# Patient Record
Sex: MALE | HISPANIC OR LATINO | URBAN - METROPOLITAN AREA
[De-identification: names, ages, dates, MRNs, and addresses within clinical notes are randomized per-mention and may not be internally consistent; named-entity substitution may affect disease eponyms.]

---

## 2021-11-20 ENCOUNTER — EMERGENCY (EMERGENCY)
Facility: HOSPITAL | Age: 41
LOS: 1 days | Discharge: HOME | End: 2021-11-20
Admitting: EMERGENCY MEDICINE
Payer: COMMERCIAL

## 2021-11-20 VITALS
RESPIRATION RATE: 18 BRPM | HEART RATE: 74 BPM | HEIGHT: 67 IN | WEIGHT: 145.06 LBS | TEMPERATURE: 99 F | DIASTOLIC BLOOD PRESSURE: 80 MMHG | OXYGEN SATURATION: 100 % | SYSTOLIC BLOOD PRESSURE: 120 MMHG

## 2021-11-20 DIAGNOSIS — Z87.39 PERSONAL HISTORY OF OTHER DISEASES OF THE MUSCULOSKELETAL SYSTEM AND CONNECTIVE TISSUE: ICD-10-CM

## 2021-11-20 DIAGNOSIS — M54.50 LOW BACK PAIN, UNSPECIFIED: ICD-10-CM

## 2021-11-20 PROCEDURE — 99283 EMERGENCY DEPT VISIT LOW MDM: CPT | Mod: 25

## 2021-11-20 PROCEDURE — 96372 THER/PROPH/DIAG INJ SC/IM: CPT | Mod: XU

## 2021-11-20 PROCEDURE — 99284 EMERGENCY DEPT VISIT MOD MDM: CPT

## 2021-11-20 RX ORDER — LIDOCAINE 4 G/100G
1 CREAM TOPICAL
Qty: 7 | Refills: 0
Start: 2021-11-20 | End: 2021-11-26

## 2021-11-20 RX ORDER — CYCLOBENZAPRINE HYDROCHLORIDE 10 MG/1
1 TABLET, FILM COATED ORAL
Qty: 14 | Refills: 0
Start: 2021-11-20

## 2021-11-20 RX ORDER — KETOROLAC TROMETHAMINE 30 MG/ML
30 SYRINGE (ML) INJECTION ONCE
Refills: 0 | Status: DISCONTINUED | OUTPATIENT
Start: 2021-11-20 | End: 2021-11-20

## 2021-11-20 RX ADMIN — Medication 30 MILLIGRAM(S): at 13:04

## 2021-11-20 NOTE — ED PROVIDER NOTE - PATIENT PORTAL LINK FT
You can access the FollowMyHealth Patient Portal offered by [facility] by registering at the following website: [email or web address]. By joining [email or web address]’s FollowMyHealth portal, you will also be able to view your health information using other applications (apps) compatible with our system.

## 2021-11-20 NOTE — ED ADULT NURSE NOTE - OBJECTIVE STATEMENT
Pt is a 42 y/o male A&Ox4 in NAD ambulatory with steady gait c/o left buttock tingling radiating down left leg. Pt reports hx of sciatica and seen at another hospital for same c/o yesterday. Pt denies fall/injury, bowel/bladder dysfunction.

## 2021-11-20 NOTE — ED PROVIDER NOTE - OBJECTIVE STATEMENT
[id number]    40 y/o m hx herniated disc in lumbar spine presents c/o pain to left buttock radiating to left calf over the past few months.  Pt went to his pmd 3 weeks ago, was given rx of diclofenac and gabapentin, started PT.  Pt stating overall pain has improved slightly but still bothering him at night.  Pt went to an outside ED yesterday, was given toradol and lidocaine patch which helped, came to ED wanting MRI.  Pt denies weakness, saddle anesthesia, bowel/bladder incontinence, all other ROS negative.

## 2021-11-20 NOTE — ED PROVIDER NOTE - CLINICAL SUMMARY MEDICAL DECISION MAKING FREE TEXT BOX
42 y/o m hx herniated disc in lumbar spine presents c/o low back pain radiating to left calf over the past few months.  Pt with no neuro deficits in ED, comfortable on exam, ambulatory.  Not concerned for cauda equina.  Discussed possibility of muscle spasm, sciatica, possible herniated disc.  No indication for emergent MRI in ED, will d/c to f/u with pmd for further imaging, possible ortho spine referral, given rx for flexeril and lidocaine patches.

## 2021-11-20 NOTE — ED PROVIDER NOTE - NSFOLLOWUPINSTRUCTIONS_ED_ALL_ED_FT
Ciática    Sciatica       La ciática es el dolor, debilidad, hormigueo o pérdida de la sensibilidad (adormecimiento) a lo raheem del nervio ciático. El nervio ciático comienza en la parte inferior de la espalda y desciende por la parte posterior de cada pierna. Suele desaparecer por sí yvette o con tratamiento. A veces, la ciática puede volver a aparecer (ser recurrente).      ¿Cuáles son las causas?  Esta afección se produce cuando el nervio ciático se comprime o se ejerce presión sobre él. [place] puede ser el resultado de:  •Un disco que sobresale demasiado entre los huesos de la columna vertebral (hernia de disco).      •Los cambios que se producen en los discos vertebrales al envejecer.      •Lindsay afección en un músculo de las nalgas.      •Un crecimiento óseo adicional cerca del nervio ciático.      •Lindsay rotura (fractura) de la lance que está entre los huesos de la cadera (pelvis).      •Embarazo.       •Tumor. [place] es poco frecuente.        ¿Qué incrementa el riesgo?  Es más probable que tengan esta afección las personas que:  •Practican deportes que ponen presión o tensión sobre la columna vertebral.      •Tienen poca fuerza y facilidad de movimiento (flexibilidad).      •Garza tenido lindsay lesión en la espalda en el pasado.      •Garza tenido lindsay cirugía en la espalda.      •Permanecen sentadas sid largos períodos.      •Realizan actividades que implican agacharse o levantar objetos lindsay y otra vez.      •Tienen mucho sobrepeso (es cassia).        ¿Cuáles son los signos o los síntomas?  Los síntomas pueden variar de leves a muy graves. Pueden incluir los siguientes:•Cualquiera de los siguientes problemas en la parte inferior de la espalda, piernas, cadera o nalgas:  •Hormigueo leve, pérdida de la sensibilidad o dolor sordo.      •Sensación de ardor.      •Dolor cristi.         •Pérdida de la sensibilidad en la parte posterior de la pantorrilla o la planta del pie.      •Debilidad en las piernas.       •Dolor muy intenso en la espalda que dificulta el movimiento.      Estos síntomas pueden empeorar al toser, estornudar o reír. También pueden empeorar al sentarse o estar de pie sid largos períodos.      ¿Cómo se trata?  A menudo, esta afección mejora sin tratamiento. Sin embargo, el tratamiento puede incluir:  •Cambiar de actividad física o reducirla cuando siente dolor.      •Hacer ejercicios y estiramientos.      •Aplicar hielo o calor sobre la lance afectada.    •Medicamentos para lo siguiente:   •Aliviar el dolor y la inflamación.       •Relajar los músculos.         •Inyecciones de medicamentos que ayudan a aliviar el dolor, la irritación y la hinchazón.      •Cirugía.        Siga estas instrucciones en rosario casa:    Medicamentos     •Hermann los medicamentos de venta shailesh y los recetados solamente lola se lo haya indicado el médico.    •Consulte a rosario médico si el medicamento que le recetaron:  •Hace que sea necesario que evite conducir o usar maquinaria pesada.    •Puede causarle dificultad para defecar (estreñimiento). Es posible que deba susan estas medidas para prevenir o tratar los problemas para defecar:  •Beber suficiente líquido para mantener el pis (la orina) de color amarillo pálido.      •Susan medicamentos recetados o de venta shailesh.      •[employer] [employer] alimentos ricos en fibra. Entre ellos, frijoles, cereales integrales y frutas y verduras frescas.      •Limitar los alimentos con alto contenido de grasa y azúcar. Estos incluyen alimentos fritos o dulces.            Control del dolor                 •Si se lo indican, aplique hielo en la lance afectada.  •Ponga el hielo en lindsay bolsa plástica.      •Coloque lindsay toalla entre la piel y la bolsa.      •Coloque el hielo sid 20 minutos, 2 a 3 veces por día.      •Si se lo indican, aplique calor en la lance afectada. Use la paulo de calor que el médico le indique, por ejemplo, lindsay compresa de calor húmedo o lindsay almohadilla térmica.  •Coloque lindsay toalla entre la piel y la paulo de calor.      •Aplique calor sid 20 a 30 minutos.      •Retire la paulo de calor si la piel se pone de color singh brillante. [place] es muy importante si no puede sentir dolor, calor o frío. Puede correr un riesgo mayor de sufrir quemaduras.          Actividad      •Retome sachin actividades habituales lola se lo haya indicado el médico. Pregúntele al médico qué actividades son seguras para usted.      •Evite las actividades que empeoran los síntomas.    •Descanse por breves períodos sid el día.  •Cuando descanse sid períodos más largos, melody alguna actividad física o un estiramiento entre los períodos de descanso.      •Evite estar sentado sid largos períodos sin moverse. Levántese y muévase al menos lindsay vez cada hora.        •Melody ejercicios y estírese con regularidad, lola se lo indicó el médico.    • No levante nada que pese más de 10 libras (4.5 kg) mientras tenga síntomas de ciática.  •Aunque no tenga síntomas, evite levantar objetos pesados.      •Evite levantar objetos pesados de forma repetida.      •Al levantar objetos, hágalo siempre de lindsay forma que sea rai para rosario cuerpo. Para esto, debe hacer lo siguiente:  •Flexione las rodillas.      •Mantenga el objeto cerca del cuerpo.      •No gire el cuerpo.        Instrucciones generales     •Mantenga un peso saludable.      •Use calzado cómodo, que le dé soporte al pie. Evite usar tacones.      •Evite dormir sobre un colchón que sea demasiado blando o demasiado monica. Es posible que sienta menos dolor si duerme en un colchón con apoyo suficientemente firme para la espalda.      •Concurra a todas las visitas de seguimiento lola se lo haya indicado el médico. [place] es importante.        Comuníquese con un médico si:  •Tiene un dolor con estas características:  •Lo despierta cuando está dormido.      •Empeora al estar recostado.      •Es peor que el dolor que tenía en el pasado.      •Dura más de 4 semanas.        •Pierde peso sin proponérselo.        Solicite ayuda inmediatamente si:    •No puede controlar la orina (micción) ni la evacuación de la materia fecal (defecación).    •Tiene debilidad en alguna de estas zonas, y la debilidad empeora:  •La parte inferior de la espalda.      •La lance que se encuentra entre los huesos de las caderas.      •Las nalgas.      •Las piernas.        •Siente irritación o inflamación en la espalda.      •Tiene sensación de ardor al orinar.        Resumen    •La ciática es el dolor, debilidad, hormigueo o pérdida de la sensibilidad (adormecimiento) a lo raheem del nervio ciático.      •Esta afección se produce cuando el nervio ciático se comprime o se ejerce presión sobre él.      •La ciática puede causar dolor, hormigueo o pérdida de la sensibilidad (adormecimiento) en la parte inferior de la espalda, las piernas, las caderas y las nalgas.      •El tratamiento a menudo incluye reposo, ejercicio, medicamentos y aplicar hielo o calor en la lance afectada.      Esta información no tiene olla fin reemplazar el consejo del médico. Asegúrese de hacerle al médico cualquier pregunta que tenga.

## 2021-11-20 NOTE — ED ADULT TRIAGE NOTE - CHIEF COMPLAINT QUOTE
Pt co tingling beginning at left buttocks radiating down L leg. Hx of sciatica and states, "I went to another hospital yesterday and I felt better with medication but I wanted to check that im okay today." denies injury/ trauma/ loss of control of bowels or bladder. Ambulatory with steady gait.

## 2024-04-04 NOTE — ED ADULT NURSE NOTE - CINV DISCH MEDS REVIEWED YN
Is This A New Presentation, Or A Follow-Up?: Moles How Severe Is Your Skin Lesion?: mild Has Your Skin Lesion Been Treated?: not been treated Yes

## 2024-05-15 NOTE — ED ADULT NURSE NOTE - NS_ED_NURSE_TEACHING_TOPIC_ED_A_ED
Received request via: Pharmacy    Was the patient seen in the last year in this department? Yes1/30/24    Does the patient have an active prescription (recently filled or refills available) for medication(s) requested? No    Pharmacy Name: [facility]    Does the patient have FDC Plus and need 100 day supply (blood pressure, diabetes and cholesterol meds only)? Medication is not for cholesterol, blood pressure or diabetes  
sciatica